# Patient Record
Sex: FEMALE | Race: BLACK OR AFRICAN AMERICAN | NOT HISPANIC OR LATINO | Employment: FULL TIME | URBAN - METROPOLITAN AREA
[De-identification: names, ages, dates, MRNs, and addresses within clinical notes are randomized per-mention and may not be internally consistent; named-entity substitution may affect disease eponyms.]

---

## 2017-01-05 ENCOUNTER — OFFICE VISIT (OUTPATIENT)
Dept: OPTOMETRY | Facility: CLINIC | Age: 52
End: 2017-01-05
Payer: COMMERCIAL

## 2017-01-05 DIAGNOSIS — E11.9 DIABETES MELLITUS TYPE 2 WITHOUT RETINOPATHY: Primary | ICD-10-CM

## 2017-01-05 DIAGNOSIS — H52.4 MYOPIA WITH PRESBYOPIA OF BOTH EYES: ICD-10-CM

## 2017-01-05 DIAGNOSIS — H52.13 MYOPIA WITH PRESBYOPIA OF BOTH EYES: ICD-10-CM

## 2017-01-05 DIAGNOSIS — H40.013 OAG (OPEN ANGLE GLAUCOMA) SUSPECT, LOW RISK, BILATERAL: ICD-10-CM

## 2017-01-05 PROCEDURE — 92004 COMPRE OPH EXAM NEW PT 1/>: CPT | Mod: S$GLB,,, | Performed by: OPTOMETRIST

## 2017-01-05 PROCEDURE — 99999 PR PBB SHADOW E&M-EST. PATIENT-LVL II: CPT | Mod: PBBFAC,,, | Performed by: OPTOMETRIST

## 2017-01-05 PROCEDURE — 92133 CPTRZD OPH DX IMG PST SGM ON: CPT | Mod: S$GLB,,, | Performed by: OPTOMETRIST

## 2017-01-05 PROCEDURE — 92015 DETERMINE REFRACTIVE STATE: CPT | Mod: S$GLB,,, | Performed by: OPTOMETRIST

## 2017-01-05 NOTE — MR AVS SNAPSHOT
[facility] - Optometry  [address]  [place] [address]  Phone: [phone]  Fax: [phone]                  Pascual Portillo   2017 1:30 PM   Office Visit    Description:  Female : 1965   Provider:  Geoff Ornelas OD   Department:  [facility] - Optometry           Reason for Visit     Diabetic Eye Exam           Diagnoses this Visit        Comments    Diabetes mellitus type 2 without retinopathy    -  Primary     OAG (open angle glaucoma) suspect, low risk, bilateral         Myopia with presbyopia of both eyes                To Do List           Future Appointments        Provider Department Dept Phone    2017 2:45 PM Yunior Franklin, DENNY [facility] Podiatry [phone]    3/6/2017 7:00 AM LAB, SAME DAY [facility] [phone]    3/13/2017 1:00 PM Taylor Wetzel MD [facility] Internal Medicine [phone]      Your Future Surgeries/Procedures     2017   Surgery with Denny Shepherd MD   [facility] ([facility])    [address] [facility] [address] [phone]              Goals (5 Years of Data)     None      Follow-Up and Disposition     Return in about 1 year (around 2018).      [facility]     [facility] Nurse Care Line - 24/7 Assistance  Registered nurses in the [facility] provide clinical advisement, health education, appointment booking, and other advisory services.  Call for this free service at [phone].             Medications           Message regarding Medications     Verify the changes and/or additions to your medication regime listed below are the same as discussed with your clinician today.  If any of these changes or additions are incorrect, please notify your healthcare provider.             Verify that the below list of medications is an accurate representation of the medications you are currently taking.  If none reported, the list may be blank. If  "incorrect, please contact your healthcare provider. Carry this list with you in case of emergency.           Current Medications     albuterol 90 mcg/actuation inhaler Inhale 2 puffs into the lungs every 6 (six) hours as needed for Wheezing. Proair    amlodipine (NORVASC) 10 MG tablet Take 1 tablet (10 mg total) by mouth once daily.    aspirin (ECOTRIN) 81 MG EC tablet Take 1 tablet (81 mg total) by mouth once daily.    atorvastatin (LIPITOR) 40 MG tablet Take 1 tablet (40 mg total) by mouth once daily.    blood sugar diagnostic Strp 1 strip by Misc.(Non-Drug; Combo Route) route 3 (three) times daily before meals.    diclofenac sodium 1 % Gel Apply 2 g topically 4 (four) times daily.    fluconazole (DIFLUCAN) 150 MG Tab TAKE 1 TABLET BY MOUTH EVERY 3 DAYS    hydrochlorothiazide (HYDRODIURIL) 25 MG tablet Take 1 tablet (25 mg total) by mouth once daily.    insulin syringe-needle U-100 0.5 mL 31 x 3/8" Syrg 1 Syringe by Misc.(Non-Drug; Combo Route) route 2 (two) times daily. Can adjust size per pt's insurance/preference    LANTUS 100 unit/mL injection INJECT 40 UNITS UNDER THE SKIN EVERY MORNING AND 45 UNITS EVERY EVENING. (90 day supply)    losartan (COZAAR) 25 MG tablet Take 1 tablet (25 mg total) by mouth once daily.    metformin (GLUCOPHAGE) 1000 MG tablet Take 1 tablet (1,000 mg total) by mouth daily with breakfast.    omeprazole (PRILOSEC) 20 MG capsule Take 1 capsule (20 mg total) by mouth once daily.           Clinical Reference Information           Allergies as of 1/5/2017     Clonidine      Immunizations Administered on Date of Encounter - 1/5/2017     None      Orders Placed During Today's Visit     Future Labs/Procedures Expected by Expires    OCT - Optic Nerve  As directed 1/5/2018      [facility] Sign-Up     Activating your [facility] account is as easy as 1-2-3!     1) Visit [email or web address], select Sign Up Now, enter this activation code and your date of birth, then select " Next.  [email or web address]  Expires: 1/27/2017  4:45 PM      2) Create a username and password to use when you visit [facility] in the future and select a security question in case you lose your password and select Next.    3) Enter your e-mail address and click Sign Up!    Additional Information  If you have questions, please e-mail [email or web address] or call [phone] to talk to our [facility] staff. Remember, [facility] is NOT to be used for urgent needs. For medical emergencies, dial 911.         Smoking Cessation     If you would like to quit smoking:   You may be eligible for free services if you are a [place] resident and started smoking cigarettes before September 1, 1988.  Call the Smoking Cessation Trust (SCT) toll free at (519) 797-4604 or (495) 060-1695.   Call 0-800-QUIT-NOW if you do not meet the above criteria.

## 2017-01-05 NOTE — PROGRESS NOTES
"HPI     MARY: 1-2 Years ago at [facility]    LBS: does not check BS daily  No results found for: HGBA1C  A1C 8.6 last month when she went to the "mini clinic"  Patient states she will begin to have her blood work here at [facility]    Patient states in the morning her vision is very blurry and will clear up   at some point throughout the day.  Denies flashes, floaters, diplopia,   photophobia, glare, HA's, or pain.    No eye meds at this time.        Last edited by Malissa Dean on 1/5/2017  1:37 PM.         Assessment /Plan     For exam results, see Encounter Report.    Diabetes mellitus type 2 without retinopathy  -No retinopathy noted today.  Continued control with primary care physician and annual comprehensive eye exam.    OAG (open angle glaucoma) suspect, low risk, bilateral  -     OCT - Optic Nerve; Future  -Enlarged CD's OU, +FHx (sister)  -OCT WNL  -monitor at annual    Myopia with presbyopia of both eyes  -Ok OTC +1.50      RTC 1 yr                 "

## 2017-01-06 ENCOUNTER — TELEPHONE (OUTPATIENT)
Dept: INTERNAL MEDICINE | Facility: CLINIC | Age: 52
End: 2017-01-06

## 2017-01-06 DIAGNOSIS — E11.9 DIABETES MELLITUS TYPE 2, INSULIN DEPENDENT: Primary | ICD-10-CM

## 2017-01-06 DIAGNOSIS — Z79.4 DIABETES MELLITUS TYPE 2, INSULIN DEPENDENT: Primary | ICD-10-CM

## 2017-01-06 NOTE — TELEPHONE ENCOUNTER
----- Message from Yojana Marion sent at 1/6/2017 10:47 AM CST -----  Contact: Self/ [phone]   Pt want to let the doctor know that the insurance is not coving her insulin. Pt stated her insurance will cover Basaglar, Levemir, and Tresiba. Pt stated someone has to call in other one of those insulin to the pharmacy. Please call and advise     Thank you

## 2017-01-27 ENCOUNTER — TELEPHONE (OUTPATIENT)
Dept: ENDOSCOPY | Facility: HOSPITAL | Age: 52
End: 2017-01-27

## 2017-01-27 NOTE — TELEPHONE ENCOUNTER
Per Pre Call nurse, patient requests an emailed copy of colonoscopy prep instructions. Instructions emailed to address provided by patient.

## 2017-03-13 ENCOUNTER — OFFICE VISIT (OUTPATIENT)
Dept: INTERNAL MEDICINE | Facility: CLINIC | Age: 52
End: 2017-03-13
Payer: COMMERCIAL

## 2017-03-13 VITALS
SYSTOLIC BLOOD PRESSURE: 126 MMHG | HEART RATE: 88 BPM | DIASTOLIC BLOOD PRESSURE: 76 MMHG | BODY MASS INDEX: 32.14 KG/M2 | HEIGHT: 65 IN | WEIGHT: 192.88 LBS

## 2017-03-13 DIAGNOSIS — I10 ESSENTIAL HYPERTENSION: ICD-10-CM

## 2017-03-13 DIAGNOSIS — Z00.00 HEALTH CARE MAINTENANCE: ICD-10-CM

## 2017-03-13 DIAGNOSIS — B37.31 YEAST VAGINITIS: ICD-10-CM

## 2017-03-13 DIAGNOSIS — Z86.73 HISTORY OF STROKE: ICD-10-CM

## 2017-03-13 DIAGNOSIS — Z23 NEED FOR TDAP VACCINATION: ICD-10-CM

## 2017-03-13 DIAGNOSIS — G44.329 CHRONIC POST-TRAUMATIC HEADACHE, NOT INTRACTABLE: ICD-10-CM

## 2017-03-13 DIAGNOSIS — E11.9 DIABETES MELLITUS TYPE 2, INSULIN DEPENDENT: Primary | ICD-10-CM

## 2017-03-13 DIAGNOSIS — Z79.4 DIABETES MELLITUS TYPE 2, INSULIN DEPENDENT: Primary | ICD-10-CM

## 2017-03-13 PROCEDURE — 3074F SYST BP LT 130 MM HG: CPT | Mod: S$GLB,,, | Performed by: INTERNAL MEDICINE

## 2017-03-13 PROCEDURE — 99214 OFFICE O/P EST MOD 30 MIN: CPT | Mod: 25,S$GLB,, | Performed by: INTERNAL MEDICINE

## 2017-03-13 PROCEDURE — 1160F RVW MEDS BY RX/DR IN RCRD: CPT | Mod: S$GLB,,, | Performed by: INTERNAL MEDICINE

## 2017-03-13 PROCEDURE — 90715 TDAP VACCINE 7 YRS/> IM: CPT | Mod: S$GLB,,, | Performed by: INTERNAL MEDICINE

## 2017-03-13 PROCEDURE — 90471 IMMUNIZATION ADMIN: CPT | Mod: S$GLB,,, | Performed by: INTERNAL MEDICINE

## 2017-03-13 PROCEDURE — 3046F HEMOGLOBIN A1C LEVEL >9.0%: CPT | Mod: S$GLB,,, | Performed by: INTERNAL MEDICINE

## 2017-03-13 PROCEDURE — 3078F DIAST BP <80 MM HG: CPT | Mod: S$GLB,,, | Performed by: INTERNAL MEDICINE

## 2017-03-13 PROCEDURE — 4010F ACE/ARB THERAPY RXD/TAKEN: CPT | Mod: S$GLB,,, | Performed by: INTERNAL MEDICINE

## 2017-03-13 PROCEDURE — 99999 PR PBB SHADOW E&M-EST. PATIENT-LVL IV: CPT | Mod: PBBFAC,,, | Performed by: INTERNAL MEDICINE

## 2017-03-13 RX ORDER — FLUCONAZOLE 150 MG/1
TABLET ORAL
Qty: 3 TABLET | Refills: 0 | Status: SHIPPED | OUTPATIENT
Start: 2017-03-13

## 2017-03-13 RX ORDER — TRAMADOL HYDROCHLORIDE 50 MG/1
50 TABLET ORAL EVERY 6 HOURS PRN
Qty: 20 TABLET | Refills: 0 | Status: SHIPPED | OUTPATIENT
Start: 2017-03-13 | End: 2017-03-23

## 2017-03-13 NOTE — PROGRESS NOTES
Subjective:       Patient ID: Pascual Portillo is a 51 y.o. female.    Chief Complaint: Follow-up    HPI  52 yo F here for follow up of DM, HTN.    lantus 40 am; 45 pm  atorva 40  Metformin 1000 bf  Losartan 25   amlod 10  hctz 25    She went to ED for thumb blister. Her BP was 200s and she had missed her medication. Back to normal now.     Currently with yeast infection. Itching and irritated.     BG have been in 200s.     Hx stroke .   Having pain in back of head. Intermittent lasts seconds to minutes. Seems stable over last 4 years. Sharp and intense. Happening only few times per month.   Hx of slipping at [employer] and hit her head hard in . Pain present since then.   Previous neurologist Dr. Patiño (sp) last seen a year ago.   Was previously rx tramadol to take sparingly for headache. Tylenol upset stomach.     Carpal tunnel bilaterally. Last shot about 2 months ago.     ------------  HTN     DM - A1C on 16 at [facility] - 10.8% - out of insulin for awhile  Back on her insulin now from .      lantus 40 unit(s) am and 45 unit(s) in the evening.      She had mini-stroke      She gets pain in various parts of head - neck radiating forward or in front of head. Had MRI with angio without obvious cause. Was seeing neurologist at [facility].      GERD  Reports bad acid reflux.   She has never had EGD. Dad  of stomach cancer.   Omeprazole works well.      She had 16 siblings total. 3 brothers are still alive. She has 3 sisters who are diabetic.      She is current sometime smoker - not every day. Previously smoked more regularly - usually just one cigarette/day.      Gyn - Dr. Christensen at [facility]     Works for [employer].      Uses [employer]    Review of Systems   Constitutional: Negative for fever.   HENT: Negative.    Eyes: Negative.    Respiratory: Negative for shortness of breath.    Cardiovascular: Negative for chest pain and leg swelling.   Gastrointestinal: Negative for abdominal  "pain, diarrhea, nausea and vomiting.   Genitourinary: Negative.    Musculoskeletal: Negative for arthralgias.   Skin: Negative for rash.   Psychiatric/Behavioral: Negative.        Objective:   /76  Pulse 88  Ht 5' 5" (1.651 m)  Wt 87.5 kg (192 lb 14.4 oz)  BMI 32.1 kg/m2     Physical Exam   Constitutional: She is oriented to person, place, and time. She appears well-developed and well-nourished. No distress.   HENT:   Head: Normocephalic and atraumatic.   Cardiovascular: Normal rate and regular rhythm.    Pulmonary/Chest: Effort normal. No respiratory distress. She has no wheezes. She has no rales.   Neurological: She is alert and oriented to person, place, and time.   Skin: Skin is warm and dry. She is not diaphoretic.   Psychiatric: She has a normal mood and affect. Her behavior is normal.       Assessment:       1. Diabetes mellitus type 2, insulin dependent    2. Essential hypertension    3. Health care maintenance    4. Yeast vaginitis    5. Need for Tdap vaccination    6. Chronic post-traumatic headache, not intractable    7. History of stroke        Plan:       Pascual was seen today for follow-up.    Diagnoses and all orders for this visit:    Diabetes mellitus type 2, insulin dependent  Diet reviewed  Offered education for RD review, declines for now  a1c tomorrow with fasting labs  Continue current meds    Essential hypertension  At goal  Continue current meds    Health care maintenance  -     Hepatitis C antibody; Future  -     Ambulatory referral to Obstetrics / Gynecology    Yeast vaginitis  -     fluconazole (DIFLUCAN) 150 MG Tab; TAKE 1 TABLET BY MOUTH EVERY 3 DAYS as needed yeast infection    Need for Tdap vaccination  -     Tdap Vaccine    Chronic post-traumatic headache, not intractable  -     tramadol (ULTRAM) 50 mg tablet; Take 1 tablet (50 mg total) by mouth every 6 (six) hours as needed for Pain.  -     Ambulatory referral to Neurology    History of stroke  -     Ambulatory referral to " Neurology   Asa 81mg, diabetes, htn and cholesterol as above

## 2017-03-13 NOTE — MR AVS SNAPSHOT
[facility] - Internal Medicine  [address] [facility] [address]  Phone: [phone]  Fax: [phone]                  Pascual Portillo   3/13/2017 1:00 PM   Office Visit    Description:  Female : 1965   Provider:  Taylor Wetzel MD   Department:  [facility] - Internal Medicine           Reason for Visit     Follow-up           Diagnoses this Visit        Comments    Diabetes mellitus type 2, insulin dependent    -  Primary     Essential hypertension         Health care maintenance         Yeast vaginitis         Need for Tdap vaccination         Chronic post-traumatic headache, not intractable         History of stroke                To Do List           Future Appointments        Provider Department Dept Phone    3/14/2017 7:10 AM LAB, APPOINTMENT [facility] [phone]    3/16/2017 2:00 PM TYRON Aldrich NP [facility] - OB/GYN 5th Floor [phone]    2017 1:40 PM Abel Turner III, MD [facility] - Neurology [phone]      Goals (5 Years of Data)     None      Follow-Up and Disposition     Return in about 3 months (around 2017).       These Medications        Disp Refills Start End    fluconazole (DIFLUCAN) 150 MG Tab 3 tablet 0 3/13/2017     TAKE 1 TABLET BY MOUTH EVERY 3 DAYS as needed yeast infection    Pharmacy: [facility]/pharmacy #[id number] [place], [place] - [address] Ph #: [phone]       tramadol (ULTRAM) 50 mg tablet 20 tablet 0 3/13/2017 3/23/2017    Take 1 tablet (50 mg total) by mouth every 6 (six) hours as needed for Pain. - Oral    Pharmacy: [facility]/pharmacy #[id number] [place], [place] - [address] Ph #: [phone]         [facility]     [facility] Care [facility] -  Assistance  Registered nurses in the [facility] provide clinical advisement, health education, appointment booking, and other advisory services.  Call for this free service at [phone].             Medications            Message regarding Medications     Verify the changes and/or additions to your medication regime listed below are the same as discussed with your clinician today.  If any of these changes or additions are incorrect, please notify your healthcare provider.        START taking these NEW medications        Refills    tramadol (ULTRAM) 50 mg tablet 0    Sig: Take 1 tablet (50 mg total) by mouth every 6 (six) hours as needed for Pain.    Class: Normal    Route: Oral      CHANGE how you are taking these medications     Start Taking Instead of    fluconazole (DIFLUCAN) 150 MG Tab fluconazole (DIFLUCAN) 150 MG Tab    Dosage:  TAKE 1 TABLET BY MOUTH EVERY 3 DAYS as needed yeast infection Dosage:  TAKE 1 TABLET BY MOUTH EVERY 3 DAYS    Reason for Change:  Reorder            Verify that the below list of medications is an accurate representation of the medications you are currently taking.  If none reported, the list may be blank. If incorrect, please contact your healthcare provider. Carry this list with you in case of emergency.           Current Medications     albuterol 90 mcg/actuation inhaler Inhale 2 puffs into the lungs every 6 (six) hours as needed for Wheezing. Proair    amlodipine (NORVASC) 10 MG tablet Take 1 tablet (10 mg total) by mouth once daily.    aspirin (ECOTRIN) 81 MG EC tablet Take 1 tablet (81 mg total) by mouth once daily.    atorvastatin (LIPITOR) 40 MG tablet Take 1 tablet (40 mg total) by mouth once daily.    blood sugar diagnostic Strp 1 strip by Misc.(Non-Drug; Combo Route) route 3 (three) times daily before meals.    diclofenac sodium 1 % Gel Apply 2 g topically 4 (four) times daily.    fluconazole (DIFLUCAN) 150 MG Tab TAKE 1 TABLET BY MOUTH EVERY 3 DAYS as needed yeast infection    hydrochlorothiazide (HYDRODIURIL) 25 MG tablet Take 1 tablet (25 mg total) by mouth once daily.    insulin detemir (LEVEMIR) 100 unit/mL injection INJECT 40 UNITS UNDER THE SKIN EVERY MORNING AND 45 UNITS  "EVERY EVENING    insulin syringe-needle U-100 0.5 mL 31 x 3/8" Syrg 1 Syringe by Misc.(Non-Drug; Combo Route) route 2 (two) times daily. Can adjust size per pt's insurance/preference    losartan (COZAAR) 25 MG tablet Take 1 tablet (25 mg total) by mouth once daily.    metformin (GLUCOPHAGE) 1000 MG tablet Take 1 tablet (1,000 mg total) by mouth daily with breakfast.    omeprazole (PRILOSEC) 20 MG capsule Take 1 capsule (20 mg total) by mouth once daily.    tramadol (ULTRAM) 50 mg tablet Take 1 tablet (50 mg total) by mouth every 6 (six) hours as needed for Pain.           Clinical Reference Information           Your Vitals Were     BP Pulse Height Weight BMI    126/76 88 5' 5" (1.651 m) 87.5 kg (192 lb 14.4 oz) 32.1 kg/m2      Blood Pressure          Most Recent Value    BP  126/76      Allergies as of 3/13/2017     Clonidine      Immunizations Administered on Date of Encounter - 3/13/2017     Name Date Dose VIS Date Route    TDAP 3/13/2017 0.5 mL 2/24/2015 Intramuscular      Orders Placed During Today's Visit      Normal Orders This Visit    Ambulatory referral to Neurology     Ambulatory referral to Obstetrics / Gynecology     Tdap Vaccine     Future Labs/Procedures Expected by Expires    Hepatitis C antibody  3/13/2017 5/12/2018      [facility] Sign-Up     Activating your [facility] account is as easy as 1-2-3!     1) Visit [email or web address], select Sign Up Now, enter this activation code and your date of birth, then select Next.  [id number]  Expires: 4/27/2017  1:35 PM      2) Create a username and password to use when you visit [facility] in the future and select a security question in case you lose your password and select Next.    3) Enter your e-mail address and click Sign Up!    Additional Information  If you have questions, please e-mail [email or web address] or call [phone] to talk to our [facility] staff. Remember, [facility] is NOT to be used for urgent needs. For medical emergencies, dial 911.    "      Instructions    Please call endoscopy at [phone] to schedule your colonoscopy.          Smoking Cessation     If you would like to quit smoking:   You may be eligible for free services if you are a [place] resident and started smoking cigarettes before September 1, 1988.  Call the Smoking Cessation Trust (SCT) toll free at (177) 587-7065 or (632) 526-2569.   Call 0-490-QUIT-NOW if you do not meet the above criteria.            Language Assistance Services     ATTENTION: Language assistance services are available, free of charge. Please call [phone].      ATENCIÓN: Si habla español, tiene a mohamud disposición servicios gratuitos de asistencia lingüística. Llame al [phone].     CHÚ Ý: N?u b?n nói Ti?ng Vi?t, có các d?ch v? h? tr? ngôn ng? mi?n phí dành cho b?n. G?i s? [phone].         [facility] complies with applicable Federal civil rights laws and does not discriminate on the basis of race, color, national origin, age, disability, or sex.

## 2017-03-16 ENCOUNTER — OFFICE VISIT (OUTPATIENT)
Dept: OBSTETRICS AND GYNECOLOGY | Facility: CLINIC | Age: 52
End: 2017-03-16
Payer: COMMERCIAL

## 2017-03-16 VITALS
DIASTOLIC BLOOD PRESSURE: 74 MMHG | BODY MASS INDEX: 30.47 KG/M2 | SYSTOLIC BLOOD PRESSURE: 118 MMHG | WEIGHT: 189.63 LBS | HEIGHT: 66 IN

## 2017-03-16 DIAGNOSIS — E89.40 POST HYSTERECTOMY MENOPAUSE: ICD-10-CM

## 2017-03-16 DIAGNOSIS — Z01.419 VISIT FOR GYNECOLOGIC EXAMINATION: Primary | ICD-10-CM

## 2017-03-16 DIAGNOSIS — Z90.710 POST HYSTERECTOMY MENOPAUSE: ICD-10-CM

## 2017-03-16 DIAGNOSIS — Z87.898 HISTORY OF ABNORMAL MAMMOGRAM: ICD-10-CM

## 2017-03-16 DIAGNOSIS — Z12.39 BREAST CANCER SCREENING: ICD-10-CM

## 2017-03-16 PROCEDURE — 3074F SYST BP LT 130 MM HG: CPT | Mod: S$GLB,,, | Performed by: NURSE PRACTITIONER

## 2017-03-16 PROCEDURE — 99999 PR PBB SHADOW E&M-EST. PATIENT-LVL III: CPT | Mod: PBBFAC,,, | Performed by: NURSE PRACTITIONER

## 2017-03-16 PROCEDURE — 99386 PREV VISIT NEW AGE 40-64: CPT | Mod: S$GLB,,, | Performed by: NURSE PRACTITIONER

## 2017-03-16 PROCEDURE — 3078F DIAST BP <80 MM HG: CPT | Mod: S$GLB,,, | Performed by: NURSE PRACTITIONER

## 2017-03-16 RX ORDER — LIDOCAINE HYDROCHLORIDE 20 MG/ML
SOLUTION ORAL; TOPICAL
Refills: 0 | COMMUNITY
Start: 2017-01-20

## 2017-03-16 NOTE — PROGRESS NOTES
"HISTORY OF PRESENT ILLNESS:    Pascual Portillo is a 51 y.o. female,   presents today for her routine visit and has no gyn complaints.      Past Medical History:   Diagnosis Date    Diabetes mellitus type 2, insulin dependent     Hypertension     Stroke        Past Surgical History:   Procedure Laterality Date    BREAST BIOPSY  2016    CARPAL TUNNEL RELEASE Bilateral     COLONOSCOPY N/A 2017    Procedure: COLONOSCOPY;  Surgeon: Denny Shepherd MD;  Location: [address]);  Service: Endoscopy;  Laterality: N/A;    HYSTERECTOMY      TVH, ovaries remain (AUB)       MEDICATIONS AND ALLERGIES:      Current Outpatient Prescriptions:     albuterol 90 mcg/actuation inhaler, Inhale 2 puffs into the lungs every 6 (six) hours as needed for Wheezing. Proair, Disp: 1 each, Rfl: 11    amlodipine (NORVASC) 10 MG tablet, Take 1 tablet (10 mg total) by mouth once daily., Disp: 90 tablet, Rfl: 3    aspirin (ECOTRIN) 81 MG EC tablet, Take 1 tablet (81 mg total) by mouth once daily., Disp: 90 tablet, Rfl: 3    atorvastatin (LIPITOR) 40 MG tablet, Take 1 tablet (40 mg total) by mouth once daily., Disp: 90 tablet, Rfl: 3    BD INSULIN SYRINGE ULTRA-FINE 1/2 mL 31 gauge x 15/64" Syrg, USE TWICE DAILY, Disp: , Rfl: 2    blood sugar diagnostic Strp, 1 strip by Misc.(Non-Drug; Combo Route) route 3 (three) times daily before meals., Disp: 300 strip, Rfl: 4    fluconazole (DIFLUCAN) 150 MG Tab, TAKE 1 TABLET BY MOUTH EVERY 3 DAYS as needed yeast infection, Disp: 3 tablet, Rfl: 0    hydrochlorothiazide (HYDRODIURIL) 25 MG tablet, Take 1 tablet (25 mg total) by mouth once daily., Disp: 90 tablet, Rfl: 3    insulin detemir (LEVEMIR) 100 unit/mL injection, INJECT 40 UNITS UNDER THE SKIN EVERY MORNING AND 45 UNITS EVERY EVENING, Disp: 90 mL, Rfl: 1    insulin syringe-needle U-100 0.5 mL 31 x 3/8" Syrg, 1 Syringe by Misc.(Non-Drug; Combo Route) route 2 (two) times daily. Can adjust size per pt's " insurance/preference, Disp: 200 Syringe, Rfl: 2    LIDOCAINE VISCOUS 2 % solution, GARGLE AND SPIT 2 TEASPOONS (10ML'S) BY MOUTH EVERY 4 HOURS FOR SORE THROAT UP TO 10 DOSES MAX 8/DAY, Disp: , Rfl: 0    losartan (COZAAR) 25 MG tablet, Take 1 tablet (25 mg total) by mouth once daily., Disp: 90 tablet, Rfl: 3    metformin (GLUCOPHAGE) 1000 MG tablet, Take 1 tablet (1,000 mg total) by mouth daily with breakfast., Disp: 90 tablet, Rfl: 3    omeprazole (PRILOSEC) 20 MG capsule, Take 1 capsule (20 mg total) by mouth once daily., Disp: 90 capsule, Rfl: 3    tramadol (ULTRAM) 50 mg tablet, Take 1 tablet (50 mg total) by mouth every 6 (six) hours as needed for Pain., Disp: 20 tablet, Rfl: 0    Review of patient's allergies indicates:   Allergen Reactions    Clonidine Hives       Family History   Problem Relation Age of Onset    Breast cancer Mother     Diabetes Father     Hypertension Father     Stomach cancer Father     Diabetes Sister     Lung cancer Brother     Diabetes Sister     Lung cancer Sister     Diabetes Daughter     Lindsey syndrome Daughter     No Known Problems Son        Social History     Social History    Marital status: Single     Spouse name: N/A    Number of children: N/A    Years of education: N/A     Occupational History    Not on file.     Social History Main Topics    Smoking status: Current Some Day Smoker    Smokeless tobacco: Not on file    Alcohol use Yes      Comment: occasional    Drug use: No    Sexual activity: Not Currently     Partners: Male     Birth control/ protection: See Surgical Hx     Other Topics Concern    Not on file     Social History Narrative       OB HISTORY: Number of vaginal deliveries:2    COMPREHENSIVE GYN HISTORY:  PAP History: Denies abnormal Paps.  Infection History: Denies STDs. Denies PID.  Benign History: Denies uterine fibroids. Denies ovarian cysts. Denies endometriosis. Denies other conditions.  Cancer History: Denies cervical cancer.  "Denies uterine cancer or hyperplasia. Denies ovarian cancer. Denies vulvar cancer or pre-cancer. Denies vaginal cancer or pre-cancer. Denies breast cancer. Denies colon cancer.  Sexual Activity History: Denies currently being sexually active  Menstrual History: Denies menses. Pt is  (2013 Hyst) not on ERT.     ROS:  GENERAL: No weight changes. No swelling. No fatigue. No fever. No vasomotor symptoms.  CARDIOVASCULAR: No chest pain. No shortness of breath. No leg cramps.   NEUROLOGICAL: No headaches. No vision changes.  BREASTS: No pain. No lumps. No discharge.  ABDOMEN: No pain. No nausea. No vomiting. No diarrhea. No constipation.  REPRODUCTIVE: No abnormal bleeding.  VULVA: No pain. No lesions. No itching.  VAGINA: No relaxation. No itching. No odor. No discharge. No lesions.  URINARY: No incontinence. No nocturia. No frequency. No dysuria.    /74  Ht 5' 6" (1.676 m)  Wt 86 kg (189 lb 9.5 oz)  BMI 30.6 kg/m2    PE:  APPEARANCE: Well nourished, well developed, in no acute distress.  AFFECT: WNL, alert and oriented x 3.  SKIN: No acne or hirsutism.  NECK: Neck symmetric without masses or thyromegaly.  NODES: No inguinal, cervical, axillary or femoral lymph node enlargement.  CHEST: Good respiratory effort.   ABDOMEN: Soft. No tenderness or masses.   BREASTS: Symmetrical, no skin changes or visible lesions. No palpable masses, nipple discharge bilaterally.  PELVIC: ATROPHIC EXTERNAL FEMALE GENITALIA without lesions. Normal hair distribution. Adequate perineal body, normal urethral meatus. VAGINA DRY / ATROPHIC without lesions or discharge. No significant cystocele or rectocele. Bimanual exam shows CERVIX and UTERUS to be SURGICALLY ABSENT. Adnexa without masses or tenderness.  EXTREMITIES: No edema.    DIAGNOSIS:  1. Visit for gynecologic examination    2. Post hysterectomy menopause    3. History of abnormal mammogram    4. Breast cancer screening        Orders Placed This Encounter    Mammo Digital " Screening Bilat with Tomosynthesis_CAD   Up to date on colonoscopy    COUNSELING:  The patient was counseled today on  -ACS PAP guidelines (no paps), with recommendations for yearly pelvic exams as her uterus and cervix were removed for benign reasons and ovaries remain;  -recommendation for yearly mammogram;  -to see her primary care physician for all other health maintenance.    FOLLOW-UP with me for next routine visit.

## 2017-03-16 NOTE — LETTER
March 16, 2017      Taylor Wetzel MD  [address]  [facility] [address]           [facility]  [address]  [facility] [address]  Phone: [phone]          Patient: Pascual Portillo   MR Number: [id number]   YOB: 1965   Date of Visit: 3/16/2017       Dear Dr. Taylor Wetzel:    Thank you for referring Pascual Portillo to me for evaluation. Attached you will find relevant portions of my assessment and plan of care.    If you have questions, please do not hesitate to call me. I look forward to following Pascual Portillo along with you.    Sincerely,    TYRON Aldrich, NP    Enclosure  CC:  No Recipients    If you would like to receive this communication electronically, please contact [email or web address] or [phone] to request more information on [employer] Link access.    For providers and/or their staff who would like to refer a patient to [facility], please contact us through our one-stop-shop provider referral line, [facility] , at [phone].    If you feel you have received this communication in error or would no longer like to receive these types of communications, please e-mail [email or web address]

## 2017-05-09 ENCOUNTER — OFFICE VISIT (OUTPATIENT)
Dept: NEUROLOGY | Facility: CLINIC | Age: 52
End: 2017-05-09
Payer: COMMERCIAL

## 2017-05-09 VITALS
SYSTOLIC BLOOD PRESSURE: 120 MMHG | HEIGHT: 66 IN | BODY MASS INDEX: 30.82 KG/M2 | WEIGHT: 191.81 LBS | HEART RATE: 77 BPM | DIASTOLIC BLOOD PRESSURE: 76 MMHG

## 2017-05-09 DIAGNOSIS — Z86.73 HISTORY OF STROKE: ICD-10-CM

## 2017-05-09 DIAGNOSIS — M54.2 NECK PAIN: ICD-10-CM

## 2017-05-09 DIAGNOSIS — M54.81 BILATERAL OCCIPITAL NEURALGIA: ICD-10-CM

## 2017-05-09 DIAGNOSIS — G44.329 CHRONIC POST-TRAUMATIC HEADACHE, NOT INTRACTABLE: Primary | ICD-10-CM

## 2017-05-09 PROCEDURE — 99999 PR PBB SHADOW E&M-EST. PATIENT-LVL III: CPT | Mod: PBBFAC,,, | Performed by: NURSE PRACTITIONER

## 2017-05-09 PROCEDURE — 99204 OFFICE O/P NEW MOD 45 MIN: CPT | Mod: S$GLB,,, | Performed by: NURSE PRACTITIONER

## 2017-05-09 PROCEDURE — 3074F SYST BP LT 130 MM HG: CPT | Mod: S$GLB,,, | Performed by: NURSE PRACTITIONER

## 2017-05-09 PROCEDURE — 1160F RVW MEDS BY RX/DR IN RCRD: CPT | Mod: S$GLB,,, | Performed by: NURSE PRACTITIONER

## 2017-05-09 PROCEDURE — 3078F DIAST BP <80 MM HG: CPT | Mod: S$GLB,,, | Performed by: NURSE PRACTITIONER

## 2017-05-09 RX ORDER — GABAPENTIN 300 MG/1
300 CAPSULE ORAL 3 TIMES DAILY
Qty: 90 CAPSULE | Refills: 5 | Status: SHIPPED | OUTPATIENT
Start: 2017-05-09 | End: 2017-06-05 | Stop reason: SDUPTHER

## 2017-05-09 NOTE — PATIENT INSTRUCTIONS
1. Track headaches and bring calendars to follow-up     2. Gabapentin 300 mg ... Take 1 capsule nightly for 2 weeks, after 2 weeks take one capsule at night and one in the morning for 2 weeks     3. Start physical therapy.    4. Follow-up in 4-6 weeks.

## 2017-05-09 NOTE — PROGRESS NOTES
Subjective:    Patient ID: Pascual Portillo   MRN: [id number]  Date: 05/09/2017    Referred By: Self Referral    Chief Complaint: Consult and Headache    History of Present Illness:   51 y.o. female with HTN, asthma, T2DM, and history of a stroke,  presents alone for evaluation of her headaches.     Pain began in 2013 following a slip and fall [place] a bowling [place], admits to hitting her head and having neck pain following this fall.  Head pain is described as a severe, sharp electrical pain that starts in her neck and radiates forward.  Usually occurs about twice per week.  She has not been able to identify any triggers or timing that causes pain to occur, states they are random.  Endorses also experiencing a thumping pain her head, location is also random, these occur 1-2 times per month.  Rates pain at 10/10.  No other symptoms are associated with pain, but does admit to mild tenderness in her neck.  She has never done physical therapy following the fall.      History is pertinent for a stroke in 2012, previously followed by Dr. Patiño (neurologist) at [facility].    Denies any aggravating factors, pain is gone so quickly there is no time to try things to alleviate the pain.     Head Trauma? Infection? Fever? Cancer? Pregnancy? <-- fall in 2013 around the onset of these pains    Treatments Tried and Response  Aspirin - no change  Tramadol - no change     Family Hx of Migraines, aneurysms, brain tumors <-- none     Positives in bold: Hx of Kidney Stones, asthma, GI bleed, osteoporosis, CAD/MI, CVA/TIA, DM      Social History  Alcohol - rarely  Smoke - denies  Recreational Drug Use- denies      Current Medications::     albuterol 90 mcg/actuation inhaler, Inhale 2 puffs into the lungs every 6 (six) hours as needed for Wheezing. Proair, Disp: 1 each, Rfl: 11    amlodipine (NORVASC) 10 MG tablet, Take 1 tablet (10 mg total) by mouth once daily., Disp: 90 tablet, Rfl: 3    aspirin (ECOTRIN) 81 MG EC tablet, Take 1 tablet (81  "mg total) by mouth once daily., Disp: 90 tablet, Rfl: 3    atorvastatin (LIPITOR) 40 MG tablet, Take 1 tablet (40 mg total) by mouth once daily., Disp: 90 tablet, Rfl: 3    BD INSULIN SYRINGE ULTRA-FINE 1/2 mL 31 gauge x 15/64" Syrg, USE TWICE DAILY, Disp: , Rfl: 2    blood sugar diagnostic Strp, 1 strip by Misc.(Non-Drug; Combo Route) route 3 (three) times daily before meals., Disp: 300 strip, Rfl: 4    fluconazole (DIFLUCAN) 150 MG Tab, TAKE 1 TABLET BY MOUTH EVERY 3 DAYS as needed yeast infection, Disp: 3 tablet, Rfl: 0    hydrochlorothiazide (HYDRODIURIL) 25 MG tablet, Take 1 tablet (25 mg total) by mouth once daily., Disp: 90 tablet, Rfl: 3    insulin detemir (LEVEMIR) 100 unit/mL injection, INJECT 40 UNITS UNDER THE SKIN EVERY MORNING AND 45 UNITS EVERY EVENING, Disp: 90 mL, Rfl: 1    insulin syringe-needle U-100 0.5 mL 31 x 3/8" Syrg, 1 Syringe by Misc.(Non-Drug; Combo Route) route 2 (two) times daily. Can adjust size per pt's insurance/preference, Disp: 200 Syringe, Rfl: 2    LIDOCAINE VISCOUS 2 % solution, GARGLE AND SPIT 2 TEASPOONS (10ML'S) BY MOUTH EVERY 4 HOURS FOR SORE THROAT UP TO 10 DOSES MAX 8/DAY, Disp: , Rfl: 0    losartan (COZAAR) 25 MG tablet, Take 1 tablet (25 mg total) by mouth once daily., Disp: 90 tablet, Rfl: 3    metformin (GLUCOPHAGE) 1000 MG tablet, Take 1 tablet (1,000 mg total) by mouth daily with breakfast., Disp: 90 tablet, Rfl: 3    omeprazole (PRILOSEC) 20 MG capsule, Take 1 capsule (20 mg total) by mouth once daily., Disp: 90 capsule, Rfl: 3    gabapentin (NEURONTIN) 300 MG capsule, Take 1 capsule (300 mg total) by mouth 3 (three) times daily., Disp: 90 capsule, Rfl: 5    Review of Systems   Review of Systems   Constitutional: Negative for activity change, chills, diaphoresis, fatigue and fever.   HENT: Negative for congestion, ear pain, facial swelling, hearing loss, rhinorrhea, sinus pressure, tinnitus and voice change.    Eyes: Negative for photophobia, pain, " "discharge, redness and visual disturbance.   Respiratory: Negative for chest tightness and shortness of breath.    Cardiovascular: Negative for chest pain and palpitations.   Gastrointestinal: Negative for nausea and vomiting.   Genitourinary: Negative for difficulty urinating and dysuria.   Musculoskeletal: Positive for neck pain and neck stiffness. Negative for back pain, gait problem and myalgias.   Skin: Negative for pallor.   Allergic/Immunologic: Negative for immunocompromised state.   Neurological: Positive for headaches. Negative for dizziness, syncope, facial asymmetry, speech difficulty, weakness, light-headedness and numbness.   Psychiatric/Behavioral: Negative for agitation, behavioral problems, confusion, decreased concentration, dysphoric mood, hallucinations and sleep disturbance. The patient is not nervous/anxious and is not hyperactive.        Objective:    Vitals:  /76  Pulse 77  Ht 5' 6" (1.676 m)  Wt 87 kg (191 lb 12.8 oz)  BMI 30.96 kg/m2    Physical Exam   Constitutional:   She appears well-developed and well-nourished. She is well groomed  HENT:    Head: Atraumatic, oral and nasal mucosa intact.  Frontalis was NTTP , temporalis was NTTP    Eyes: Conjunctivae and EOM are normal. Pupils are equal, round, and reactive to light   Neck: Neck supple. No carotid bruit heard bilaterally.Occiput and trapezius mildly TTP  Cardiovascular: Normal rate and normal heart sounds. No murmur heard.  Pulmonary/Chest: Effort normal and breath sounds normal  Musculoskeletal: Normal range of motion. No joint stiffness. No vertebral point tenderness.  Skin: Skin is warm and dry.  Psychiatric: Normal mood and affect.     Neuro exam:    Mental status:  The patient is awake, alert, and oriented to person, place and time.  Language is intact. Naming, repetition and fluency are clear and intact.   Remote and recent memory are intact  No findings to suggest executive dysfuntion    Patient has adequate insight  " "  Mood is stable    Cranial Nerves:  Fundoscopic examination does not reveal any occult papilledema.    Pupils are equal and reactive to light.    Extraocular movements are intact and without nystagmus.    Visual fields are full to confrontation testing.   Facial movement is symmetric.  Facial sensation is intact.    Hearing is normal.   Uvula in midline. Tongue in midline without fasiculation  FROM of neck in all (6) directions, shoulder shrug symmetrical.    Coordination:     Finger to nose - normal and symmetric bilaterally   Heel to shin test - normal and symmetric bilaterally     Motor:  Normal muscle bulk and symmetry. No fasciculations were noted.   Tremor/pronator drift not apparent.    strength and finger extension strength was strong and equal bilaterally  RUE:appropriate against gravity and medium force as tested 5/5  LUE: appropriate against gravity and medium force as tested 5/5  RLE:appropriate against gravity and medium force as tested 5/5              LLE: appropriate against gravity and medium force as tested 5/5    Reflexes:  Right Brachioradialis 2+  Left Brachioradialis 2+  Right Biceps 2+  Left Biceps 2+  Right Patellar2+  Left Patellar 2+    Kumari was negative     Sensory:  RUE  intact light touch and temperature  LUE intact light touch and temperature    RLE intact light touch and temperature  LLE intact light touch and temperature    Gait:   Romberg - negative   Normal gait  Tandem, Heel, and Toe Walk - normal    Assessment:  Pascual Portillo is a 51 y.o. very pleasant female who presents for evaluation of sharp pains in her head which began following a slip and fall during which she hit her head "very hard".  She endorses have neck ache and pain following the fall but was never worked up and never did physical therapy following fall.  She was previously followed by a Neurologist at [facility] who originally worked up these sharp pains and followed her after her Stroke in 2012.  Workup " included an MRI with angiogram which did not find any obvious cause of the pains.  To help with the neck pain, would like her to begin physical therapy, for which she is agreeable to.  Unable to prescribe tizanidine due to allergy to clonidine, which she states gave her terrible hives for months, so will avoid for now.  For the pain, will start her on Gabapentin 300 mg nightly for two weeks, if after two weeks no benefit but no side effects, she will add a second dose in the morning.            Plan:  - For pain - gabapentin 300 mg nightly for two weeks, if after two weeks no benefit but no side effects either, add a second dose in the morning   - Physical therapy ordered  - Please begin tracking your headaches so we can get a good idea of the frequency and if medication is helping   - RTC in 4 weeks     I have discussed realistic goals of care with patient at length as well as medication options, and need for lifestyle adjustment. I have explained that treatment will take time. We have agreed that the goal will be to reduce frequency/intensity/quantity of HA, not to be completely HA free. I have explained my non narcotic policy regarding headache treatment.    Patient to track frequency of headaches.  Patient agreeable to work on lifestyle adjustments.    All questions and concerns addressed.  Patient verbalizes understanding and is agreeable to the plan.     Dr. Swenson was available during today's encounter. Any change to plan will appear in the EMR     CC: MD Betsey Forte, FNP-C  [facility]  [phone]

## 2017-05-09 NOTE — MR AVS SNAPSHOT
[facility]  [address]  [facility] [address]  Phone: [phone]  Fax: [phone]                  Pascual Portillo   2017 2:30 PM   Office Visit    Description:  Female : 1965   Provider:  LEVI Cabrera   Department:  [facility]           Reason for Visit     Consult     Headache           Diagnoses this Visit        Comments    Bilateral occipital neuralgia         Neck pain                To Do List           Future Appointments        Provider Department Dept Phone    2017 11:00 AM Yunior Franklin DPM [facility] - Podiatry [phone]    2017 1:30 PM LEVI Cabrera [facility] [phone]      Goals (5 Years of Data)     None      Follow-Up and Disposition     Return in about 4 weeks (around 2017).       These Medications        Disp Refills Start End    gabapentin (NEURONTIN) 300 MG capsule 90 capsule 5 2017    Take 1 capsule (300 mg total) by mouth 3 (three) times daily. - Oral    Pharmacy: [facility]/pharmacy #[id number] - [place], [place] - [address] Ph #: [phone]         [facility]     [facility] -  Assistance  Unless otherwise directed by your provider, please contact [facility], our nurse care line that is available for  assistance.     Registered nurses in the [facility] provide: appointment scheduling, clinical advisement, health education, and other advisory services.  Call: [phone] (toll free)               Medications           Message regarding Medications     Verify the changes and/or additions to your medication regime listed below are the same as discussed with your clinician today.  If any of these changes or additions are incorrect, please notify your healthcare provider.        START taking these NEW medications        Refills    gabapentin (NEURONTIN) 300 MG capsule 5    Sig: Take 1 capsule (300 mg total) by mouth 3  "(three) times daily.    Class: Normal    Route: Oral           Verify that the below list of medications is an accurate representation of the medications you are currently taking.  If none reported, the list may be blank. If incorrect, please contact your healthcare provider. Carry this list with you in case of emergency.           Current Medications     albuterol 90 mcg/actuation inhaler Inhale 2 puffs into the lungs every 6 (six) hours as needed for Wheezing. Proair    amlodipine (NORVASC) 10 MG tablet Take 1 tablet (10 mg total) by mouth once daily.    aspirin (ECOTRIN) 81 MG EC tablet Take 1 tablet (81 mg total) by mouth once daily.    atorvastatin (LIPITOR) 40 MG tablet Take 1 tablet (40 mg total) by mouth once daily.    BD INSULIN SYRINGE ULTRA-FINE 1/2 mL 31 gauge x 15/64" Syrg USE TWICE DAILY    blood sugar diagnostic Strp 1 strip by Misc.(Non-Drug; Combo Route) route 3 (three) times daily before meals.    fluconazole (DIFLUCAN) 150 MG Tab TAKE 1 TABLET BY MOUTH EVERY 3 DAYS as needed yeast infection    hydrochlorothiazide (HYDRODIURIL) 25 MG tablet Take 1 tablet (25 mg total) by mouth once daily.    insulin detemir (LEVEMIR) 100 unit/mL injection INJECT 40 UNITS UNDER THE SKIN EVERY MORNING AND 45 UNITS EVERY EVENING    insulin syringe-needle U-100 0.5 mL 31 x 3/8" Syrg 1 Syringe by Misc.(Non-Drug; Combo Route) route 2 (two) times daily. Can adjust size per pt's insurance/preference    LIDOCAINE VISCOUS 2 % solution GARGLE AND SPIT 2 TEASPOONS (10ML'S) BY MOUTH EVERY 4 HOURS FOR SORE THROAT UP TO 10 DOSES MAX 8/DAY    losartan (COZAAR) 25 MG tablet Take 1 tablet (25 mg total) by mouth once daily.    metformin (GLUCOPHAGE) 1000 MG tablet Take 1 tablet (1,000 mg total) by mouth daily with breakfast.    omeprazole (PRILOSEC) 20 MG capsule Take 1 capsule (20 mg total) by mouth once daily.    gabapentin (NEURONTIN) 300 MG capsule Take 1 capsule (300 mg total) by mouth 3 (three) times daily.           Clinical " "Reference Information           Your Vitals Were     BP Pulse Height Weight BMI    120/76 77 5' 6" (1.676 m) 87 kg (191 lb 12.8 oz) 30.96 kg/m2      Blood Pressure          Most Recent Value    BP  120/76      Allergies as of 5/9/2017     Clonidine      Immunizations Administered on Date of Encounter - 5/9/2017     None      Orders Placed During Today's Visit      Normal Orders This Visit    Ambulatory Referral to Physical/Occupational Therapy       [facility] Sign-Up     Activating your [facility] account is as easy as 1-2-3!     1) Visit [email or web address], select Sign Up Now, enter this activation code and your date of birth, then select Next.  [email or web address]  Expires: 6/23/2017  3:25 PM      2) Create a username and password to use when you visit [facility] in the future and select a security question in case you lose your password and select Next.    3) Enter your e-mail address and click Sign Up!    Additional Information  If you have questions, please e-mail [email or web address] or call [phone] to talk to our [facility] staff. Remember, [facility] is NOT to be used for urgent needs. For medical emergencies, dial 911.         Instructions    1. Track headaches and bring calendars to follow-up     2. Gabapentin 300 mg ... Take 1 capsule nightly for 2 weeks, after 2 weeks take one capsule at night and one in the morning for 2 weeks     3. Start physical therapy.    4. Follow-up in 4-6 weeks.        Smoking Cessation     If you would like to quit smoking:   You may be eligible for free services if you are a [place] resident and started smoking cigarettes before September 1, 1988.  Call the Smoking Cessation Trust (SCT) toll free at (101) 693-2173 or (837) 007-5541.   Call 8-800-QUIT-NOW if you do not meet the above criteria.   Contact us via email: [email or web address]   View our website for more information: [email or web address]        Language Assistance Services     ATTENTION: Language " assistance services are available, free of charge. Please call [phone].      ATENCIÓN: Si habla demar, tiene a mohamud disposición servicios gratuitos de asistencia lingüística. Llame al [phone].     CHÚ Ý: N?u b?n nói Ti?ng Vi?t, có các d?ch v? h? tr? ngôn ng? mi?n phí dành cho b?n. G?i s? [phone].         [facility] complies with applicable Federal civil rights laws and does not discriminate on the basis of race, color, national origin, age, disability, or sex.

## 2017-06-05 DIAGNOSIS — M54.81 BILATERAL OCCIPITAL NEURALGIA: ICD-10-CM

## 2017-06-05 DIAGNOSIS — M54.2 NECK PAIN: ICD-10-CM

## 2017-06-05 RX ORDER — GABAPENTIN 300 MG/1
300 CAPSULE ORAL 3 TIMES DAILY
Qty: 270 CAPSULE | Refills: 3 | Status: SHIPPED | OUTPATIENT
Start: 2017-06-05 | End: 2018-06-05

## 2017-06-13 ENCOUNTER — OFFICE VISIT (OUTPATIENT)
Dept: NEUROLOGY | Facility: CLINIC | Age: 52
End: 2017-06-13
Payer: COMMERCIAL

## 2017-06-13 VITALS
HEIGHT: 66 IN | SYSTOLIC BLOOD PRESSURE: 117 MMHG | HEART RATE: 83 BPM | DIASTOLIC BLOOD PRESSURE: 73 MMHG | BODY MASS INDEX: 29.97 KG/M2 | WEIGHT: 186.5 LBS

## 2017-06-13 DIAGNOSIS — E11.9 DIABETES MELLITUS TYPE 2, INSULIN DEPENDENT: ICD-10-CM

## 2017-06-13 DIAGNOSIS — Z79.4 DIABETES MELLITUS TYPE 2, INSULIN DEPENDENT: ICD-10-CM

## 2017-06-13 DIAGNOSIS — M79.18 MYOFASCIAL PAIN: ICD-10-CM

## 2017-06-13 DIAGNOSIS — G47.9 TROUBLE IN SLEEPING: ICD-10-CM

## 2017-06-13 DIAGNOSIS — G44.86 CERVICOGENIC HEADACHE: ICD-10-CM

## 2017-06-13 DIAGNOSIS — M54.81 BILATERAL OCCIPITAL NEURALGIA: Primary | ICD-10-CM

## 2017-06-13 DIAGNOSIS — K21.9 GASTROESOPHAGEAL REFLUX DISEASE, ESOPHAGITIS PRESENCE NOT SPECIFIED: ICD-10-CM

## 2017-06-13 PROCEDURE — 99999 PR PBB SHADOW E&M-EST. PATIENT-LVL III: CPT | Mod: PBBFAC,,, | Performed by: NURSE PRACTITIONER

## 2017-06-13 PROCEDURE — 4010F ACE/ARB THERAPY RXD/TAKEN: CPT | Mod: S$GLB,,, | Performed by: NURSE PRACTITIONER

## 2017-06-13 PROCEDURE — 99214 OFFICE O/P EST MOD 30 MIN: CPT | Mod: S$GLB,,, | Performed by: NURSE PRACTITIONER

## 2017-06-13 RX ORDER — MELOXICAM 15 MG/1
15 TABLET ORAL DAILY
Qty: 30 TABLET | Refills: 5 | Status: SHIPPED | OUTPATIENT
Start: 2017-06-13 | End: 2017-08-02 | Stop reason: SDUPTHER

## 2017-06-13 RX ORDER — OMEPRAZOLE 40 MG/1
40 CAPSULE, DELAYED RELEASE ORAL DAILY
Qty: 30 CAPSULE | Refills: 5 | Status: SHIPPED | OUTPATIENT
Start: 2017-06-13 | End: 2018-02-15 | Stop reason: SDUPTHER

## 2017-06-13 RX ORDER — DICLOFENAC SODIUM 10 MG/G
2 GEL TOPICAL 4 TIMES DAILY
Qty: 1 TUBE | Refills: 5 | Status: SHIPPED | OUTPATIENT
Start: 2017-06-13 | End: 2017-06-23

## 2017-06-13 RX ORDER — HYDROCODONE BITARTRATE AND ACETAMINOPHEN 5; 325 MG/1; MG/1
TABLET ORAL
Refills: 0 | COMMUNITY
Start: 2017-05-31
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
## 2017-06-13 NOTE — PATIENT INSTRUCTIONS
- Start physical therapy     - I will call you in regards to muscle relaxer later this afternoon     - Continue gabapentin

## 2017-06-14 ENCOUNTER — TELEPHONE (OUTPATIENT)
Dept: INTERNAL MEDICINE | Facility: CLINIC | Age: 52
End: 2017-06-14

## 2017-06-14 NOTE — TELEPHONE ENCOUNTER
Medical records received and reviewed. Will scan to Media tab.    Micky    MMG - 3/21/16 - birads 2, repeat 1 year  Pap 12/4/12 - nl  Endometrial bx 3/6/12 - mild disordered proliferative endometrium w focal mild cystic glandular change. Negative for atypism and malignancy.   Vulva biopsy 2/16/16 - spongiform dermatitis  Episodic paroxysmal hemicrania 2013- indomethacin 25mg bid prn  Cervix surgically absent at gyn exam 2016  mmg sept 2015 mentioned as negative in gyn note.

## 2017-07-10 ENCOUNTER — TELEPHONE (OUTPATIENT)
Dept: NEUROLOGY | Facility: CLINIC | Age: 52
End: 2017-07-10

## 2017-07-10 NOTE — TELEPHONE ENCOUNTER
----- Message from Stephen Meza sent at 7/10/2017 11:44 AM CDT -----  Contact: [facility] @ [phone]  Caller is calling to get an update on the medication request ( VOLTAREN 1% GELL ) pls call to update, if not refilling pls call to have it removed from their system    [id number] - [place], [place] - [address]  [address]  [facility] [address]  Phone: [phone] Fax: [phone]

## 2017-07-24 ENCOUNTER — TELEPHONE (OUTPATIENT)
Dept: NEUROLOGY | Facility: CLINIC | Age: 52
End: 2017-07-24

## 2017-08-02 RX ORDER — MELOXICAM 15 MG/1
15 TABLET ORAL DAILY
Qty: 30 TABLET | Refills: 5 | Status: SHIPPED | OUTPATIENT
Start: 2017-08-02

## 2017-08-15 ENCOUNTER — TELEPHONE (OUTPATIENT)
Dept: NEUROLOGY | Facility: CLINIC | Age: 52
End: 2017-08-15

## 2017-08-15 NOTE — TELEPHONE ENCOUNTER
----- Message from Erendira Castro sent at 8/15/2017  1:54 PM CDT -----  Contact: Nadeem with [facility] pharmacy  Pharmacy called regarding diclofenac sodium 1 % Gel for the above pt.        Pharmacy can be reached at [phone].       Thanks!

## 2017-11-23 DIAGNOSIS — E11.42 TYPE 2 DIABETES MELLITUS WITH DIABETIC POLYNEUROPATHY, WITH LONG-TERM CURRENT USE OF INSULIN: ICD-10-CM

## 2017-11-23 DIAGNOSIS — Z79.4 TYPE 2 DIABETES MELLITUS WITH DIABETIC POLYNEUROPATHY, WITH LONG-TERM CURRENT USE OF INSULIN: ICD-10-CM

## 2017-11-23 DIAGNOSIS — E11.59 HYPERTENSION ASSOCIATED WITH DIABETES: ICD-10-CM

## 2017-11-23 DIAGNOSIS — I15.2 HYPERTENSION ASSOCIATED WITH DIABETES: ICD-10-CM

## 2017-11-24 RX ORDER — HYDROCHLOROTHIAZIDE 25 MG/1
25 TABLET ORAL DAILY
Qty: 90 TABLET | Refills: 0 | Status: SHIPPED | OUTPATIENT
Start: 2017-11-24

## 2017-11-24 RX ORDER — ATORVASTATIN CALCIUM 40 MG/1
40 TABLET, FILM COATED ORAL DAILY
Qty: 90 TABLET | Refills: 0 | Status: SHIPPED | OUTPATIENT
Start: 2017-11-24

## 2017-12-03 DIAGNOSIS — Z86.73 HISTORY OF STROKE WITHOUT RESIDUAL DEFICITS: ICD-10-CM

## 2017-12-03 DIAGNOSIS — E11.9 DIABETES MELLITUS TYPE 2, INSULIN DEPENDENT: ICD-10-CM

## 2017-12-03 DIAGNOSIS — Z79.4 DIABETES MELLITUS TYPE 2, INSULIN DEPENDENT: ICD-10-CM

## 2017-12-04 RX ORDER — ASPIRIN 81 MG/1
81 TABLET ORAL DAILY
Qty: 90 TABLET | Refills: 3 | Status: SHIPPED | OUTPATIENT
Start: 2017-12-04 | End: 2018-12-04

## 2017-12-04 RX ORDER — INSULIN DETEMIR 100 [IU]/ML
INJECTION, SOLUTION SUBCUTANEOUS
Qty: 90 ML | Refills: 1 | Status: SHIPPED | OUTPATIENT
Start: 2017-12-04

## 2017-12-14 DIAGNOSIS — E11.59 HYPERTENSION ASSOCIATED WITH DIABETES: ICD-10-CM

## 2017-12-14 DIAGNOSIS — Z79.4 TYPE 2 DIABETES MELLITUS WITH DIABETIC POLYNEUROPATHY, WITH LONG-TERM CURRENT USE OF INSULIN: ICD-10-CM

## 2017-12-14 DIAGNOSIS — E11.42 TYPE 2 DIABETES MELLITUS WITH DIABETIC POLYNEUROPATHY, WITH LONG-TERM CURRENT USE OF INSULIN: ICD-10-CM

## 2017-12-14 DIAGNOSIS — I15.2 HYPERTENSION ASSOCIATED WITH DIABETES: ICD-10-CM

## 2017-12-14 RX ORDER — AMLODIPINE BESYLATE 10 MG/1
10 TABLET ORAL DAILY
Qty: 90 TABLET | Refills: 3 | Status: SHIPPED | OUTPATIENT
Start: 2017-12-14

## 2017-12-14 RX ORDER — LOSARTAN POTASSIUM 25 MG/1
25 TABLET ORAL DAILY
Qty: 90 TABLET | Refills: 3 | Status: SHIPPED | OUTPATIENT
Start: 2017-12-14

## 2018-02-15 DIAGNOSIS — Z79.4 TYPE 2 DIABETES MELLITUS WITH DIABETIC POLYNEUROPATHY, WITH LONG-TERM CURRENT USE OF INSULIN: ICD-10-CM

## 2018-02-15 DIAGNOSIS — K21.9 GASTROESOPHAGEAL REFLUX DISEASE, ESOPHAGITIS PRESENCE NOT SPECIFIED: ICD-10-CM

## 2018-02-15 DIAGNOSIS — E11.42 TYPE 2 DIABETES MELLITUS WITH DIABETIC POLYNEUROPATHY, WITH LONG-TERM CURRENT USE OF INSULIN: ICD-10-CM

## 2018-02-15 RX ORDER — METFORMIN HYDROCHLORIDE 1000 MG/1
1000 TABLET ORAL
Qty: 90 TABLET | Refills: 1 | Status: SHIPPED | OUTPATIENT
Start: 2018-02-15 | End: 2018-08-28 | Stop reason: SDUPTHER

## 2018-02-15 RX ORDER — OMEPRAZOLE 20 MG/1
20 CAPSULE, DELAYED RELEASE ORAL DAILY
Qty: 90 CAPSULE | Refills: 3 | Status: SHIPPED | OUTPATIENT
Start: 2018-02-15 | End: 2018-12-05 | Stop reason: SDUPTHER

## 2018-05-23 ENCOUNTER — PATIENT OUTREACH (OUTPATIENT)
Dept: ADMINISTRATIVE | Facility: HOSPITAL | Age: 53
End: 2018-05-23

## 2018-05-23 ENCOUNTER — DOCUMENTATION ONLY (OUTPATIENT)
Dept: ADMINISTRATIVE | Facility: HOSPITAL | Age: 53
End: 2018-05-23

## 2018-05-23 DIAGNOSIS — Z12.11 SCREENING FOR COLON CANCER: Primary | ICD-10-CM

## 2018-05-23 NOTE — PROGRESS NOTES
Pt due for eye exam, mammogram, colon cancer screening and annual exam.  Spoke with pt and she stated she had an eye exam and mammogram at [facility], will request records.  She is agreeable to [email or web address], will mail kit to pts address.  Informed her that her annual exam is past due, pt stated she would call to make an appointment.

## 2018-05-31 DIAGNOSIS — Z12.39 BREAST CANCER SCREENING: ICD-10-CM

## 2018-08-28 DIAGNOSIS — Z79.4 TYPE 2 DIABETES MELLITUS WITH DIABETIC POLYNEUROPATHY, WITH LONG-TERM CURRENT USE OF INSULIN: ICD-10-CM

## 2018-08-28 DIAGNOSIS — E11.42 TYPE 2 DIABETES MELLITUS WITH DIABETIC POLYNEUROPATHY, WITH LONG-TERM CURRENT USE OF INSULIN: ICD-10-CM

## 2018-08-28 RX ORDER — METFORMIN HYDROCHLORIDE 1000 MG/1
1000 TABLET ORAL
Qty: 90 TABLET | Refills: 1 | Status: SHIPPED | OUTPATIENT
Start: 2018-08-28 | End: 2019-02-21 | Stop reason: SDUPTHER

## 2018-09-25 ENCOUNTER — PATIENT OUTREACH (OUTPATIENT)
Dept: ADMINISTRATIVE | Facility: HOSPITAL | Age: 53
End: 2018-09-25

## 2018-09-25 NOTE — PROGRESS NOTES
Pt due for PCP appointment.  Contacted pt and attempted to schedule visit.  Pt stated Dr. Wetzel is no long her PCP, she has a new PCP at [facility].  I informed pt I would notify Dr. Wetzel.

## 2018-12-05 DIAGNOSIS — K21.9 GASTROESOPHAGEAL REFLUX DISEASE, ESOPHAGITIS PRESENCE NOT SPECIFIED: ICD-10-CM

## 2018-12-05 RX ORDER — OMEPRAZOLE 20 MG/1
20 CAPSULE, DELAYED RELEASE ORAL DAILY
Qty: 90 CAPSULE | Refills: 0 | Status: SHIPPED | OUTPATIENT
Start: 2018-12-05 | End: 2019-06-21 | Stop reason: SDUPTHER

## 2019-02-21 DIAGNOSIS — E11.42 TYPE 2 DIABETES MELLITUS WITH DIABETIC POLYNEUROPATHY, WITH LONG-TERM CURRENT USE OF INSULIN: ICD-10-CM

## 2019-02-21 DIAGNOSIS — Z79.4 TYPE 2 DIABETES MELLITUS WITH DIABETIC POLYNEUROPATHY, WITH LONG-TERM CURRENT USE OF INSULIN: ICD-10-CM

## 2019-02-21 RX ORDER — METFORMIN HYDROCHLORIDE 1000 MG/1
1000 TABLET ORAL
Qty: 90 TABLET | Refills: 1 | Status: SHIPPED | OUTPATIENT
Start: 2019-02-21 | End: 2019-09-05 | Stop reason: SDUPTHER

## 2019-06-21 DIAGNOSIS — K21.9 GASTROESOPHAGEAL REFLUX DISEASE, ESOPHAGITIS PRESENCE NOT SPECIFIED: ICD-10-CM

## 2019-06-21 RX ORDER — OMEPRAZOLE 20 MG/1
CAPSULE, DELAYED RELEASE ORAL
Qty: 90 CAPSULE | Refills: 0 | Status: SHIPPED | OUTPATIENT
Start: 2019-06-21

## 2019-08-16 DIAGNOSIS — E11.42 TYPE 2 DIABETES MELLITUS WITH DIABETIC POLYNEUROPATHY, WITH LONG-TERM CURRENT USE OF INSULIN: ICD-10-CM

## 2019-08-16 DIAGNOSIS — Z79.4 TYPE 2 DIABETES MELLITUS WITH DIABETIC POLYNEUROPATHY, WITH LONG-TERM CURRENT USE OF INSULIN: ICD-10-CM

## 2019-08-16 RX ORDER — METFORMIN HYDROCHLORIDE 1000 MG/1
1000 TABLET ORAL
Qty: 90 TABLET | Refills: 1 | OUTPATIENT
Start: 2019-08-16

## 2019-09-05 DIAGNOSIS — Z79.4 TYPE 2 DIABETES MELLITUS WITH DIABETIC POLYNEUROPATHY, WITH LONG-TERM CURRENT USE OF INSULIN: ICD-10-CM

## 2019-09-05 DIAGNOSIS — E11.42 TYPE 2 DIABETES MELLITUS WITH DIABETIC POLYNEUROPATHY, WITH LONG-TERM CURRENT USE OF INSULIN: ICD-10-CM

## 2019-09-05 RX ORDER — METFORMIN HYDROCHLORIDE 1000 MG/1
1000 TABLET ORAL
Qty: 90 TABLET | Refills: 1 | Status: SHIPPED | OUTPATIENT
Start: 2019-09-05 | End: 2020-03-16

## 2019-10-10 ENCOUNTER — OFFICE VISIT (OUTPATIENT)
Dept: URGENT CARE | Facility: CLINIC | Age: 54
End: 2019-10-10
Payer: COMMERCIAL

## 2019-10-10 VITALS
TEMPERATURE: 97 F | OXYGEN SATURATION: 98 % | BODY MASS INDEX: 32.62 KG/M2 | RESPIRATION RATE: 15 BRPM | WEIGHT: 203 LBS | HEIGHT: 66 IN | SYSTOLIC BLOOD PRESSURE: 140 MMHG | DIASTOLIC BLOOD PRESSURE: 87 MMHG | HEART RATE: 85 BPM

## 2019-10-10 DIAGNOSIS — M79.671 FOOT PAIN, RIGHT: ICD-10-CM

## 2019-10-10 DIAGNOSIS — S92.511A CLOSED DISPLACED FRACTURE OF PROXIMAL PHALANX OF LESSER TOE OF RIGHT FOOT, INITIAL ENCOUNTER: Primary | ICD-10-CM

## 2019-10-10 PROCEDURE — 73630 X-RAY EXAM OF FOOT: CPT | Mod: FY,RT,S$GLB, | Performed by: RADIOLOGY

## 2019-10-10 PROCEDURE — 3008F BODY MASS INDEX DOCD: CPT | Mod: CPTII,S$GLB,, | Performed by: NURSE PRACTITIONER

## 2019-10-10 PROCEDURE — 3008F PR BODY MASS INDEX (BMI) DOCUMENTED: ICD-10-PCS | Mod: CPTII,S$GLB,, | Performed by: NURSE PRACTITIONER

## 2019-10-10 PROCEDURE — 99214 OFFICE O/P EST MOD 30 MIN: CPT | Mod: S$GLB,,, | Performed by: NURSE PRACTITIONER

## 2019-10-10 PROCEDURE — 3079F DIAST BP 80-89 MM HG: CPT | Mod: CPTII,S$GLB,, | Performed by: NURSE PRACTITIONER

## 2019-10-10 PROCEDURE — 3077F SYST BP >= 140 MM HG: CPT | Mod: CPTII,S$GLB,, | Performed by: NURSE PRACTITIONER

## 2019-10-10 PROCEDURE — 99214 PR OFFICE/OUTPT VISIT, EST, LEVL IV, 30-39 MIN: ICD-10-PCS | Mod: S$GLB,,, | Performed by: NURSE PRACTITIONER

## 2019-10-10 PROCEDURE — 73630 XR FOOT COMPLETE 3 VIEW RIGHT: ICD-10-PCS | Mod: FY,RT,S$GLB, | Performed by: RADIOLOGY

## 2019-10-10 PROCEDURE — 3077F PR MOST RECENT SYSTOLIC BLOOD PRESSURE >= 140 MM HG: ICD-10-PCS | Mod: CPTII,S$GLB,, | Performed by: NURSE PRACTITIONER

## 2019-10-10 PROCEDURE — 3079F PR MOST RECENT DIASTOLIC BLOOD PRESSURE 80-89 MM HG: ICD-10-PCS | Mod: CPTII,S$GLB,, | Performed by: NURSE PRACTITIONER

## 2019-10-10 RX ORDER — TRAMADOL HYDROCHLORIDE 50 MG/1
50 TABLET ORAL EVERY 6 HOURS
Qty: 20 TABLET | Refills: 0 | Status: SHIPPED | OUTPATIENT
Start: 2019-10-10

## 2019-10-10 NOTE — PROGRESS NOTES
"Subjective:       Patient ID: Pascual Portillo is a 54 y.o. female.    Vitals:  height is 5' 6" (1.676 m) and weight is 92.1 kg (203 lb). Her temperature is 97.3 °F (36.3 °C). Her blood pressure is 140/87 (abnormal) and her pulse is 85. Her respiration is 15 and oxygen saturation is 98%.     Chief Complaint: Foot Injury (right)    Patient states to have hit right foot on the corning of the couch leg (2days). Patient has pain when applying pressure and does not like to bare weight on the foot because of the pain.    Provider note begins below:    Patient has DM and states she has persistent numbness and paresthesias of bilateral feet. Denies any difference in current numbness. Cap refill , 3 sec to distal right phalanges and right DP present and strong.    Patient denies taking anything for pain to this point.    Foot Injury    The incident occurred 2 days ago. The incident occurred at home. The injury mechanism was a direct blow. The pain is present in the right toes and right foot. The pain is at a severity of 9/10. The pain has been constant since onset. Associated symptoms include an inability to bear weight.       Constitution: Negative for fatigue.   HENT: Negative for facial swelling and facial trauma.    Neck: Negative for neck stiffness.   Cardiovascular: Negative for chest trauma.   Eyes: Negative for eye trauma, double vision and blurred vision.   Gastrointestinal: Negative for abdominal trauma, abdominal pain and rectal bleeding.   Genitourinary: Negative for hematuria, missed menses, genital trauma and pelvic pain.   Musculoskeletal: Positive for pain and joint pain. Negative for joint swelling and abnormal ROM of joint.   Skin: Negative for color change, wound, abrasion, laceration and bruising.   Neurological: Negative for dizziness, history of vertigo, light-headedness, coordination disturbances, altered mental status and loss of consciousness.   Hematologic/Lymphatic: Negative for history of bleeding " disorder.   Psychiatric/Behavioral: Negative for altered mental status.       Objective:      Physical Exam   Constitutional: She is oriented to person, place, and time. She appears well-developed and well-nourished. She is cooperative.  Non-toxic appearance. She does not appear ill. No distress.   HENT:   Head: Normocephalic and atraumatic. Head is without abrasion, without contusion and without laceration.   Right Ear: Hearing, tympanic membrane, external ear and ear canal normal. No hemotympanum.   Left Ear: Hearing, tympanic membrane, external ear and ear canal normal. No hemotympanum.   Nose: Nose normal. No mucosal edema, rhinorrhea or nasal deformity. No epistaxis. Right sinus exhibits no maxillary sinus tenderness and no frontal sinus tenderness. Left sinus exhibits no maxillary sinus tenderness and no frontal sinus tenderness.   Mouth/Throat: Uvula is midline and mucous membranes are normal. No trismus in the jaw. Normal dentition. No uvula swelling. No posterior oropharyngeal erythema.   Eyes: Pupils are equal, round, and reactive to light. Conjunctivae, EOM and lids are normal. Right eye exhibits no discharge. Left eye exhibits no discharge. No scleral icterus.   Neck: Trachea normal, normal range of motion, full passive range of motion without pain and phonation normal. Neck supple. No spinous process tenderness and no muscular tenderness present. No neck rigidity. No tracheal deviation present.   Cardiovascular: Normal rate, regular rhythm, intact distal pulses and normal pulses.   Pulmonary/Chest: Effort normal. No respiratory distress.   Abdominal: Normal appearance. She exhibits no pulsatile midline mass.   Musculoskeletal: She exhibits no edema or deformity.        Right foot: There is decreased range of motion, tenderness and bony tenderness. There is no swelling, normal capillary refill, no crepitus and no deformity.        Feet:    Neurological: She is alert and oriented to person, place, and  time. She has normal strength. She displays no seizure activity. Gait abnormal. GCS eye subscore is 4. GCS verbal subscore is 5. GCS motor subscore is 6.   Antalgic gait    Skin: Skin is warm, dry, intact, not diaphoretic and not pale. Capillary refill takes less than 2 seconds. abrasion, burn, bruising and ecchymosis  Psychiatric: She has a normal mood and affect. Her speech is normal and behavior is normal. Judgment and thought content normal. Cognition and memory are normal.   Nursing note and vitals reviewed.        X-ray Foot Complete 3 View Right    Result Date: 10/10/2019  EXAMINATION: XR FOOT COMPLETE 3 VIEW RIGHT CLINICAL HISTORY: . Pain in right foot TECHNIQUE: AP, lateral, and oblique views of the right foot were performed. COMPARISON: Bilateral foot series 12/23/2016 FINDINGS: Bones are well mineralized.  Overall alignment is within normal limits.  There is nondisplaced slightly oblique fracture through the proximal metadiaphyseal junction of the 4th proximal phalanx.  No remaining osseous structures appear intact.  No dislocation or destructive osseous process.  Joint spaces appear relatively maintained.  Plantar calcaneal spur noted.  No subcutaneous emphysema or radiodense retained foreign body.     Fourth proximal phalanx acute nondisplaced fracture, as above. Electronically signed by: Vini Mares MD Date:    10/10/2019 Time:    12:15      Assessment:       1. Closed displaced fracture of proximal phalanx of lesser toe of right foot, initial encounter    2. Foot pain, right        Plan:       4th right toe yuli taped to 3rd.  Patient tolerated procedure well with NAD.    Closed displaced fracture of proximal phalanx of lesser toe of right foot, initial encounter  -     AIR CAST WALKER BOOT FOR HOME USE  -     CRUTCHES FOR HOME USE    Foot pain, right  -     X-Ray Foot Complete 3 view Right; Future; Expected date: 10/10/2019  -     traMADol (ULTRAM) 50 mg tablet; Take 1 tablet (50 mg total) by mouth  every 6 (six) hours.  Dispense: 20 tablet; Refill: 0      Patient Instructions   Please follow up with your Podiatrist in the next week as we discussed. If you are unable to get an appointment, please call this clinic for referral.      Closed Toe Fracture  Your toe is broken (fractured). This causes local pain, swelling, and sometimes bruising. This injury usually takes about 4 to 6 weeks to heal, but can sometimes take longer. Toe injuries are often treated by taping the injured toe to the next one (buddy taping). This protects the injured toe and holds it in position.     If the toenail has been severely injured, it may fall off in 1 to 2 weeks. It takes up to 12 months for a new toenail to grow back.  Home care  Follow these guidelines when caring for yourself at home:  · You may be given a cast shoe to wear to keep your toe from moving. If not, you can use a sandal or any shoe that doesnt put pressure on the injured toe until the swelling and pain go away. If using a sandal, be careful not to strike your foot against anything. Another injury could make the fracture worse. If you were given crutches, dont put full weight on the injured foot until you can do so without pain, or as directed by your healthcare provider.  · Keep your foot elevated to reduce pain and swelling. When sleeping, put a pillow under the injured leg. When sitting, support the injured leg so it is above your waist. This is very important during the first 2 days (48 hours).  · Put an ice pack on the injured area. Do this for 20 minutes every 1 to 2 hours the first day for pain relief. You can make an ice pack by wrapping a plastic bag of ice cubes in a thin towel. As the ice melts, be careful that any cloth or paper tape doesnt get wet. Continue using the ice pack 3 to 4 times a day for the next 2 days. Then use the ice pack as needed to ease pain and swelling.  · If buddy tape was used and it becomes wet or dirty, change it. You may  replace it with paper, plastic, or cloth tape. Cloth tape and paper tapes must be kept dry.  · You may use acetaminophen or ibuprofen to control pain, unless another pain medicine was prescribed. If you have chronic liver or kidney disease, talk with your healthcare provider before using these medicines. Also talk with your provider if youve had a stomach ulcer or gastrointestinal bleeding.  · You may return to sports or physical education activities after 4 weeks when you can run without pain, or as directed by your healthcare provider.  Follow-up care  Follow up with your healthcare provider in 1 week, or as advised. This is to make sure the bone is healing the way it should.  X-rays may be taken. You will be told of any new findings that may affect your care.  When to seek medical advice  Call your healthcare provider right away if any of these occur:  · Pain or swelling gets worse  · The cast/splint cracks  · The cast and padding get wet and stays wet more than 24 hours  · Bad odor from the cast/splint or wound fluid stains the cast  · Tightness or pressure under the cast/splint gets worse  · Toe becomes cold, blue, numb, or tingly  · You cant move the toe  · Signs of infection: fever, redness, warmth, swelling, or drainage from the wound or cast  · Fever of 100.4ºF (38ºC) or higher, or as directed by your healthcare provider  Date Last Reviewed: 2/1/2017  © 4111-4118 [employer]. [address] [place] [id number]. All rights reserved. This information is not intended as a substitute for professional medical care. Always follow your healthcare professional's instructions.

## 2019-10-10 NOTE — PATIENT INSTRUCTIONS
Please follow up with your Podiatrist in the next week as we discussed. If you are unable to get an appointment, please call this clinic for referral.      Closed Toe Fracture  Your toe is broken (fractured). This causes local pain, swelling, and sometimes bruising. This injury usually takes about 4 to 6 weeks to heal, but can sometimes take longer. Toe injuries are often treated by taping the injured toe to the next one (buddy taping). This protects the injured toe and holds it in position.     If the toenail has been severely injured, it may fall off in 1 to 2 weeks. It takes up to 12 months for a new toenail to grow back.  Home care  Follow these guidelines when caring for yourself at home:  · You may be given a cast shoe to wear to keep your toe from moving. If not, you can use a sandal or any shoe that doesnt put pressure on the injured toe until the swelling and pain go away. If using a sandal, be careful not to strike your foot against anything. Another injury could make the fracture worse. If you were given crutches, dont put full weight on the injured foot until you can do so without pain, or as directed by your healthcare provider.  · Keep your foot elevated to reduce pain and swelling. When sleeping, put a pillow under the injured leg. When sitting, support the injured leg so it is above your waist. This is very important during the first 2 days (48 hours).  · Put an ice pack on the injured area. Do this for 20 minutes every 1 to 2 hours the first day for pain relief. You can make an ice pack by wrapping a plastic bag of ice cubes in a thin towel. As the ice melts, be careful that any cloth or paper tape doesnt get wet. Continue using the ice pack 3 to 4 times a day for the next 2 days. Then use the ice pack as needed to ease pain and swelling.  · If buddy tape was used and it becomes wet or dirty, change it. You may replace it with paper, plastic, or cloth tape. Cloth tape and paper tapes must be kept  dry.  · You may use acetaminophen or ibuprofen to control pain, unless another pain medicine was prescribed. If you have chronic liver or kidney disease, talk with your healthcare provider before using these medicines. Also talk with your provider if youve had a stomach ulcer or gastrointestinal bleeding.  · You may return to sports or physical education activities after 4 weeks when you can run without pain, or as directed by your healthcare provider.  Follow-up care  Follow up with your healthcare provider in 1 week, or as advised. This is to make sure the bone is healing the way it should.  X-rays may be taken. You will be told of any new findings that may affect your care.  When to seek medical advice  Call your healthcare provider right away if any of these occur:  · Pain or swelling gets worse  · The cast/splint cracks  · The cast and padding get wet and stays wet more than 24 hours  · Bad odor from the cast/splint or wound fluid stains the cast  · Tightness or pressure under the cast/splint gets worse  · Toe becomes cold, blue, numb, or tingly  · You cant move the toe  · Signs of infection: fever, redness, warmth, swelling, or drainage from the wound or cast  · Fever of 100.4ºF (38ºC) or higher, or as directed by your healthcare provider  Date Last Reviewed: 2/1/2017  © 1643-8050 [employer] [address] [place] [address], [place] [address]. All rights reserved. This information is not intended as a substitute for professional medical care. Always follow your healthcare professional's instructions.

## 2019-10-10 NOTE — LETTER
October 10, 2019      [facility]  [facility] [address]  Phone: [phone]  Fax: [phone]       Patient: Pascual Portillo   YOB: 1965  Date of Visit: 10/10/2019    To Whom It May Concern:    Sd Portillo  was at [facility] on 10/10/2019. She may return to work/school on 10/11/2019 with restrictions (accomadations for injury x 1 week). If you have any questions or concerns, or if I can be of further assistance, please do not hesitate to contact me.    Sincerely,          Deion Kincaid, NP

## 2019-10-13 ENCOUNTER — TELEPHONE (OUTPATIENT)
Dept: URGENT CARE | Facility: CLINIC | Age: 54
End: 2019-10-13

## 2020-03-11 DIAGNOSIS — E11.42 TYPE 2 DIABETES MELLITUS WITH DIABETIC POLYNEUROPATHY, WITH LONG-TERM CURRENT USE OF INSULIN: ICD-10-CM

## 2020-03-11 DIAGNOSIS — Z79.4 TYPE 2 DIABETES MELLITUS WITH DIABETIC POLYNEUROPATHY, WITH LONG-TERM CURRENT USE OF INSULIN: ICD-10-CM

## 2020-03-16 RX ORDER — METFORMIN HYDROCHLORIDE 1000 MG/1
1000 TABLET ORAL
Qty: 90 TABLET | Refills: 3 | Status: SHIPPED | OUTPATIENT
Start: 2020-03-16

## 2020-03-16 NOTE — TELEPHONE ENCOUNTER
Refill Authorization Note     is requesting a refill authorization.    Brief assessment and rationale for refill: DEFER: pt no longer follows with PCP          Medication Therapy Plan: LOV 3/2017. Refill Center care gap closure suspended.                              Comments:   Requested Prescriptions   Pending Prescriptions Disp Refills    metFORMIN (GLUCOPHAGE) 1000 MG tablet [Pharmacy Med Name: METFORMIN HCL 1,000 MG TABLET] 90 tablet 0     Sig: TAKE 1 TABLET (1,000 MG TOTAL) BY MOUTH DAILY WITH BREAKFAST.       Endocrinology:  Diabetes - Biguanides Failed - 3/11/2020  3:55 AM        Failed - Office visit in past 12 months or future 90 days.     Recent Outpatient Visits            5 months ago Closed displaced fracture of proximal phalanx of lesser toe of right foot, initial encounter    [facility] Deion Kincaid NP    2 years ago Bilateral occipital neuralgia    [facility] - Neurology LEVI Cabrera    2 years ago Chronic post-traumatic headache, not intractable    [facility] Neurology LEVI Cabrera    3 years ago Visit for gynecologic examination    [facility] - OB/GYN 5th Floor TYRON Aldrich NP    3 years ago Diabetes mellitus type 2, insulin dependent    [facility] - Internal Medicine Taylor Wetzel MD                    Failed - Cr is 1.3 or below and within 360 days     No results found for: CREATININE, EXTCREATININ, ISTATCREATIN, POCCRE           Failed - HBA1C is 7.9 or below and within 180 days     No results found for: LABA1C, A1CEXT, HGBA1C, HGBGLY, BNFA7JKVC, TKVCQCP4HFSF           Failed - eGFR is 30 or above and within 360 days     No results found for: EXTEGFRAA, EXTEGFRNONAA, EXTGFRMDRD, QGFR, GFRAA, GFRNONAA, LABGLOM, POCEGFR, POCGFR, POCEGFRAAMAN, POCEGFRAAINT, POCEGFRNAMAN, POCEGFRNAINT, EGFRNONAA, ESTGFRAFRICA           Passed - Patient is at least 18 years old         Appointments  past 12m or future 3m with PCP    Date Provider    Last Visit   3/13/2017 Taylor Wetzel MD   Next Visit   Visit date not found Taylor Wetzel MD   .  ED visits in past 90 days: [unfilled]       Note composed:1:49 PM 03/16/2020

## 2020-03-16 NOTE — TELEPHONE ENCOUNTER
Please see the following assessment. This refill request still requires some action on your part.    Pt requesting metformin refill. LOV with PCP >15 months ago (0/2017) - outside Refill [facility] protocol to renew. Have pended a 90-day supply for your review. Defer refill request to you.     Thank you!

## 2025-03-17 ENCOUNTER — TELEPHONE (OUTPATIENT)
Dept: GASTROENTEROLOGY | Facility: CLINIC | Age: 60
End: 2025-03-17
Payer: MEDICARE

## 2025-03-17 NOTE — TELEPHONE ENCOUNTER
Pt informed referral received from Freida Lawrence.  Clinic appt scheduled on Tuesday, March 25, 2025 at 145pm.  Clinic address given with instructions to check in on 1st floor [facility] prior to coming to [address].